# Patient Record
Sex: FEMALE | Race: WHITE | NOT HISPANIC OR LATINO | URBAN - METROPOLITAN AREA
[De-identification: names, ages, dates, MRNs, and addresses within clinical notes are randomized per-mention and may not be internally consistent; named-entity substitution may affect disease eponyms.]

---

## 2017-05-04 ENCOUNTER — OUTPATIENT (OUTPATIENT)
Dept: OUTPATIENT SERVICES | Facility: HOSPITAL | Age: 62
LOS: 1 days | Discharge: HOME | End: 2017-05-04

## 2017-06-28 DIAGNOSIS — R92.2 INCONCLUSIVE MAMMOGRAM: ICD-10-CM

## 2018-05-01 PROBLEM — Z00.00 ENCOUNTER FOR PREVENTIVE HEALTH EXAMINATION: Status: ACTIVE | Noted: 2018-05-01

## 2018-05-03 ENCOUNTER — RECORD ABSTRACTING (OUTPATIENT)
Age: 63
End: 2018-05-03

## 2018-05-03 DIAGNOSIS — Z87.19 PERSONAL HISTORY OF OTHER DISEASES OF THE DIGESTIVE SYSTEM: ICD-10-CM

## 2018-05-03 DIAGNOSIS — B97.7 PAPILLOMAVIRUS AS THE CAUSE OF DISEASES CLASSIFIED ELSEWHERE: ICD-10-CM

## 2018-05-03 DIAGNOSIS — Z80.0 FAMILY HISTORY OF MALIGNANT NEOPLASM OF DIGESTIVE ORGANS: ICD-10-CM

## 2018-05-03 DIAGNOSIS — Z87.39 PERSONAL HISTORY OF OTHER DISEASES OF THE MUSCULOSKELETAL SYSTEM AND CONNECTIVE TISSUE: ICD-10-CM

## 2018-05-03 DIAGNOSIS — Z78.0 ASYMPTOMATIC MENOPAUSAL STATE: ICD-10-CM

## 2018-05-04 ENCOUNTER — OUTPATIENT (OUTPATIENT)
Dept: OUTPATIENT SERVICES | Facility: HOSPITAL | Age: 63
LOS: 1 days | Discharge: HOME | End: 2018-05-04

## 2018-05-04 DIAGNOSIS — Z12.31 ENCOUNTER FOR SCREENING MAMMOGRAM FOR MALIGNANT NEOPLASM OF BREAST: ICD-10-CM

## 2018-05-16 ENCOUNTER — APPOINTMENT (OUTPATIENT)
Dept: OBGYN | Facility: CLINIC | Age: 63
End: 2018-05-16

## 2018-11-02 ENCOUNTER — OUTPATIENT (OUTPATIENT)
Dept: OUTPATIENT SERVICES | Facility: HOSPITAL | Age: 63
LOS: 1 days | Discharge: HOME | End: 2018-11-02

## 2018-11-02 DIAGNOSIS — R42 DIZZINESS AND GIDDINESS: ICD-10-CM

## 2019-05-02 ENCOUNTER — APPOINTMENT (OUTPATIENT)
Dept: OBGYN | Facility: CLINIC | Age: 64
End: 2019-05-02
Payer: COMMERCIAL

## 2019-05-02 PROCEDURE — 99000 SPECIMEN HANDLING OFFICE-LAB: CPT

## 2019-05-02 PROCEDURE — 99386 PREV VISIT NEW AGE 40-64: CPT

## 2019-05-02 PROCEDURE — 99213 OFFICE O/P EST LOW 20 MIN: CPT | Mod: 25

## 2019-05-09 ENCOUNTER — OUTPATIENT (OUTPATIENT)
Dept: OUTPATIENT SERVICES | Facility: HOSPITAL | Age: 64
LOS: 1 days | Discharge: HOME | End: 2019-05-09
Payer: COMMERCIAL

## 2019-05-09 DIAGNOSIS — Z12.31 ENCOUNTER FOR SCREENING MAMMOGRAM FOR MALIGNANT NEOPLASM OF BREAST: ICD-10-CM

## 2019-05-09 PROCEDURE — 77063 BREAST TOMOSYNTHESIS BI: CPT | Mod: 26

## 2019-05-09 PROCEDURE — 77067 SCR MAMMO BI INCL CAD: CPT | Mod: 26

## 2019-05-10 DIAGNOSIS — Z78.0 ASYMPTOMATIC MENOPAUSAL STATE: ICD-10-CM

## 2019-05-10 DIAGNOSIS — E55.9 VITAMIN D DEFICIENCY, UNSPECIFIED: ICD-10-CM

## 2019-05-10 DIAGNOSIS — Z13.820 ENCOUNTER FOR SCREENING FOR OSTEOPOROSIS: ICD-10-CM

## 2019-05-10 DIAGNOSIS — M89.9 DISORDER OF BONE, UNSPECIFIED: ICD-10-CM

## 2019-07-16 ENCOUNTER — OUTPATIENT (OUTPATIENT)
Dept: OUTPATIENT SERVICES | Facility: HOSPITAL | Age: 64
LOS: 1 days | Discharge: HOME | End: 2019-07-16

## 2019-07-16 ENCOUNTER — APPOINTMENT (OUTPATIENT)
Dept: UROLOGY | Facility: CLINIC | Age: 64
End: 2019-07-16
Payer: COMMERCIAL

## 2019-07-16 ENCOUNTER — TRANSCRIPTION ENCOUNTER (OUTPATIENT)
Age: 64
End: 2019-07-16

## 2019-07-16 VITALS
DIASTOLIC BLOOD PRESSURE: 74 MMHG | SYSTOLIC BLOOD PRESSURE: 137 MMHG | WEIGHT: 140 LBS | BODY MASS INDEX: 25.76 KG/M2 | HEIGHT: 62 IN | HEART RATE: 66 BPM

## 2019-07-16 DIAGNOSIS — Z87.898 PERSONAL HISTORY OF OTHER SPECIFIED CONDITIONS: ICD-10-CM

## 2019-07-16 DIAGNOSIS — Z82.49 FAMILY HISTORY OF ISCHEMIC HEART DISEASE AND OTHER DISEASES OF THE CIRCULATORY SYSTEM: ICD-10-CM

## 2019-07-16 DIAGNOSIS — Z78.9 OTHER SPECIFIED HEALTH STATUS: ICD-10-CM

## 2019-07-16 DIAGNOSIS — R31.29 OTHER MICROSCOPIC HEMATURIA: ICD-10-CM

## 2019-07-16 LAB
BILIRUB UR QL STRIP: NORMAL
CLARITY UR: CLEAR
COLLECTION METHOD: NORMAL
GLUCOSE UR-MCNC: NORMAL
HCG UR QL: NORMAL EU/DL
HGB UR QL STRIP.AUTO: NORMAL
KETONES UR-MCNC: NORMAL
LEUKOCYTE ESTERASE UR QL STRIP: NORMAL
NITRITE UR QL STRIP: NORMAL
PH UR STRIP: 7
PROT UR STRIP-MCNC: NORMAL
SP GR UR STRIP: 1

## 2019-07-16 PROCEDURE — 99203 OFFICE O/P NEW LOW 30 MIN: CPT

## 2019-07-16 PROCEDURE — 81003 URINALYSIS AUTO W/O SCOPE: CPT | Mod: QW

## 2019-07-16 RX ORDER — CHROMIUM 200 MCG
TABLET ORAL
Refills: 0 | Status: ACTIVE | COMMUNITY

## 2019-07-16 RX ORDER — ASCORBIC ACID 1000 MG
TABLET ORAL
Refills: 0 | Status: ACTIVE | COMMUNITY

## 2019-07-16 NOTE — ASSESSMENT
[FreeTextEntry1] : This is a 63-year-old female with a history of microscopic hematuria. She denies any risk factors. She has regular gynecological examinations, by her GYN, which she states are within normal limits.\par \par All options were reviewed and patient is electing to undergo formal hematuria workup including CT urogram and follow up cystoscopy, after normal BMP. All aspects of this procedure were reviewed in detail with regards to preparation, outcomes, and adverse events. Anaphylactoid reaction to contrast dye was reviewed.\par \par Patient will follow up after for review

## 2019-07-16 NOTE — PHYSICAL EXAM
[General Appearance - Well Developed] : well developed [General Appearance - Well Nourished] : well nourished [Normal Appearance] : normal appearance [Well Groomed] : well groomed [General Appearance - In No Acute Distress] : no acute distress [Heart Rate And Rhythm] : Heart rate and rhythm were normal [Edema] : no peripheral edema [Respiration, Rhythm And Depth] : normal respiratory rhythm and effort [Exaggerated Use Of Accessory Muscles For Inspiration] : no accessory muscle use [Auscultation Breath Sounds / Voice Sounds] : lungs were clear to auscultation bilaterally [Bowel Sounds] : normal bowel sounds [Abdomen Soft] : soft [Abdomen Tenderness] : non-tender [Costovertebral Angle Tenderness] : no ~M costovertebral angle tenderness [Urinary Bladder Findings] : the bladder was normal on palpation [Normal Station and Gait] : the gait and station were normal for the patient's age [] : no rash [No Focal Deficits] : no focal deficits [Oriented To Time, Place, And Person] : oriented to person, place, and time [Affect] : the affect was normal [Mood] : the mood was normal [Not Anxious] : not anxious

## 2019-07-16 NOTE — HISTORY OF PRESENT ILLNESS
[None] : None [FreeTextEntry1] : Julissa is a 63-year-old female who was sent for consultation regarding questionable microscopic hematuria. She has been having intermittent positive for blood urine dip sticks during her PCP visits over the past 2 years. She denies ever seeing blood, dysuria, history of renal stones, current or former smoking history, toxic chemical exposures, or chemotherapeutics in the past.\par \par She has been seen her gynecologist regularly. Her last visit was 5/19 her she was told that everything is fine.\par \par She denies any bone some irritative/obstructive voiding symptoms.

## 2019-07-17 LAB
APPEARANCE: CLEAR
BILIRUBIN URINE: NEGATIVE
BLOOD URINE: NEGATIVE
COLOR: NORMAL
GLUCOSE QUALITATIVE U: NEGATIVE
KETONES URINE: NEGATIVE
LEUKOCYTE ESTERASE URINE: NEGATIVE
NITRITE URINE: NEGATIVE
PH URINE: 7
PROTEIN URINE: NEGATIVE
SPECIFIC GRAVITY URINE: 1.01
URINE CYTOLOGY: NORMAL
UROBILINOGEN URINE: NORMAL

## 2019-07-18 ENCOUNTER — OTHER (OUTPATIENT)
Age: 64
End: 2019-07-18

## 2019-07-18 LAB — BACTERIA UR CULT: NORMAL

## 2019-07-21 ENCOUNTER — FORM ENCOUNTER (OUTPATIENT)
Age: 64
End: 2019-07-21

## 2019-07-22 ENCOUNTER — OUTPATIENT (OUTPATIENT)
Dept: OUTPATIENT SERVICES | Facility: HOSPITAL | Age: 64
LOS: 1 days | Discharge: HOME | End: 2019-07-22
Payer: COMMERCIAL

## 2019-07-22 DIAGNOSIS — R31.29 OTHER MICROSCOPIC HEMATURIA: ICD-10-CM

## 2019-07-22 PROCEDURE — 74178 CT ABD&PLV WO CNTR FLWD CNTR: CPT | Mod: 26

## 2019-08-13 ENCOUNTER — APPOINTMENT (OUTPATIENT)
Dept: UROLOGY | Facility: CLINIC | Age: 64
End: 2019-08-13
Payer: COMMERCIAL

## 2019-08-13 VITALS — WEIGHT: 140 LBS | HEIGHT: 62 IN | BODY MASS INDEX: 25.76 KG/M2

## 2019-08-13 PROCEDURE — 52000 CYSTOURETHROSCOPY: CPT

## 2019-12-03 ENCOUNTER — APPOINTMENT (OUTPATIENT)
Dept: UROLOGY | Facility: CLINIC | Age: 64
End: 2019-12-03
Payer: COMMERCIAL

## 2019-12-03 VITALS — BODY MASS INDEX: 25.76 KG/M2 | HEIGHT: 62 IN | WEIGHT: 140 LBS

## 2019-12-03 DIAGNOSIS — R31.29 OTHER MICROSCOPIC HEMATURIA: ICD-10-CM

## 2019-12-03 LAB
BILIRUB UR QL STRIP: NORMAL
CLARITY UR: CLEAR
COLLECTION METHOD: NORMAL
GLUCOSE UR-MCNC: NORMAL
HCG UR QL: NORMAL EU/DL
HGB UR QL STRIP.AUTO: NORMAL
KETONES UR-MCNC: NORMAL
LEUKOCYTE ESTERASE UR QL STRIP: 25
NITRITE UR QL STRIP: NORMAL
PH UR STRIP: 8
PROT UR STRIP-MCNC: NORMAL
SP GR UR STRIP: 1.01

## 2019-12-03 PROCEDURE — 99213 OFFICE O/P EST LOW 20 MIN: CPT

## 2019-12-03 NOTE — ASSESSMENT
[FreeTextEntry1] : Resolved microscopic hematuria post negative workup 3 months ago. No treatment required. Follow up one year

## 2019-12-03 NOTE — REVIEW OF SYSTEMS
[Fever] : no fever [Shortness Of Breath] : no shortness of breath [Chest Pain] : no chest pain [Abdominal Pain] : no abdominal pain [Dysuria] : no dysuria [Confused] : no confusion

## 2019-12-03 NOTE — HISTORY OF PRESENT ILLNESS
[FreeTextEntry1] : History of microscopic hematuria status post benign workup 3 months ago. She has no urinary complaints, no dysuria, no gross hematuria no flank pain no urgency.  UA is negative for blood today

## 2019-12-03 NOTE — PHYSICAL EXAM
[General Appearance - In No Acute Distress] : no acute distress [Costovertebral Angle Tenderness] : no ~M costovertebral angle tenderness [] : no respiratory distress [Edema] : no peripheral edema [Normal Station and Gait] : the gait and station were normal for the patient's age [Oriented To Time, Place, And Person] : oriented to person, place, and time

## 2020-07-24 ENCOUNTER — APPOINTMENT (OUTPATIENT)
Dept: OBGYN | Facility: CLINIC | Age: 65
End: 2020-07-24
Payer: COMMERCIAL

## 2020-07-24 VITALS
WEIGHT: 139 LBS | BODY MASS INDEX: 25.42 KG/M2 | DIASTOLIC BLOOD PRESSURE: 80 MMHG | TEMPERATURE: 98.1 F | SYSTOLIC BLOOD PRESSURE: 130 MMHG

## 2020-07-24 PROCEDURE — 99396 PREV VISIT EST AGE 40-64: CPT

## 2020-07-30 ENCOUNTER — RESULT REVIEW (OUTPATIENT)
Age: 65
End: 2020-07-30

## 2020-07-30 ENCOUNTER — OUTPATIENT (OUTPATIENT)
Dept: OUTPATIENT SERVICES | Facility: HOSPITAL | Age: 65
LOS: 1 days | Discharge: HOME | End: 2020-07-30
Payer: COMMERCIAL

## 2020-07-30 DIAGNOSIS — Z12.31 ENCOUNTER FOR SCREENING MAMMOGRAM FOR MALIGNANT NEOPLASM OF BREAST: ICD-10-CM

## 2020-07-30 PROCEDURE — 77063 BREAST TOMOSYNTHESIS BI: CPT | Mod: 26

## 2020-07-30 PROCEDURE — 77067 SCR MAMMO BI INCL CAD: CPT | Mod: 26

## 2021-07-26 ENCOUNTER — APPOINTMENT (OUTPATIENT)
Dept: OBGYN | Facility: CLINIC | Age: 66
End: 2021-07-26
Payer: COMMERCIAL

## 2021-07-26 VITALS
DIASTOLIC BLOOD PRESSURE: 82 MMHG | SYSTOLIC BLOOD PRESSURE: 129 MMHG | WEIGHT: 129 LBS | BODY MASS INDEX: 23.74 KG/M2 | HEIGHT: 62 IN

## 2021-07-26 DIAGNOSIS — Z01.419 ENCOUNTER FOR GYNECOLOGICAL EXAMINATION (GENERAL) (ROUTINE) W/OUT ABNORMAL FINDINGS: ICD-10-CM

## 2021-07-26 PROCEDURE — 99396 PREV VISIT EST AGE 40-64: CPT

## 2021-07-26 NOTE — HISTORY OF PRESENT ILLNESS
[FreeTextEntry1] : Patient is 65 years old para 4-0-0-4 last menstrual period age 49\par She denies postmenopausal bleeding and is presently without complaints

## 2021-07-26 NOTE — HISTORY OF PRESENT ILLNESS
[FreeTextEntry1] : Patient is 65 years old para 4-0-0-4 last menstrual period at age 49.\par She denies postmenopausal bleeding and is presently without complaints

## 2021-07-26 NOTE — PHYSICAL EXAM
[Awake] : awake [Mass] : no breast mass [Acute Distress] : no acute distress [Axillary LAD] : no axillary lymphadenopathy [Nipple Discharge] : no nipple discharge [Tender] : non tender [Cystocele] : a cystocele [No Bleeding] : there was no active vaginal bleeding [Appropriately responsive] : appropriately responsive [Alert] : alert [No Acute Distress] : no acute distress [No Lymphadenopathy] : no lymphadenopathy [Regular Rate Rhythm] : regular rate rhythm [No Murmurs] : no murmurs [Clear to Auscultation B/L] : clear to auscultation bilaterally [Soft] : soft [Non-tender] : non-tender [Non-distended] : non-distended [No HSM] : No HSM [No Lesions] : no lesions [No Mass] : no mass [Oriented x3] : oriented x3 [Examination Of The Breasts] : a normal appearance [No Masses] : no breast masses were palpable [Labia Majora] : normal [Labia Minora] : normal [Atrophy] : atrophy [Normal] : normal [Uterine Adnexae] : normal

## 2021-07-26 NOTE — PHYSICAL EXAM
[Awake] : awake [Mass] : no breast mass [Acute Distress] : no acute distress [Nipple Discharge] : no nipple discharge [Axillary LAD] : no axillary lymphadenopathy [Tender] : non tender [Cystocele] : a cystocele [No Bleeding] : there was no active vaginal bleeding [Appropriately responsive] : appropriately responsive [Alert] : alert [No Acute Distress] : no acute distress [No Lymphadenopathy] : no lymphadenopathy [Regular Rate Rhythm] : regular rate rhythm [No Murmurs] : no murmurs [Clear to Auscultation B/L] : clear to auscultation bilaterally [Soft] : soft [Non-tender] : non-tender [Non-distended] : non-distended [No HSM] : No HSM [No Lesions] : no lesions [No Mass] : no mass [Oriented x3] : oriented x3 [Examination Of The Breasts] : a normal appearance [No Masses] : no breast masses were palpable [Labia Majora] : normal [Labia Minora] : normal [Atrophy] : atrophy [Normal] : normal [Uterine Adnexae] : normal

## 2021-07-26 NOTE — COUNSELING
[Nutrition] : nutrition [Exercise] : exercise [Vitamins/Supplements] : vitamins/supplements [Breast Self Exam] : breast self exam

## 2021-08-03 ENCOUNTER — RESULT REVIEW (OUTPATIENT)
Age: 66
End: 2021-08-03

## 2021-08-03 ENCOUNTER — OUTPATIENT (OUTPATIENT)
Dept: OUTPATIENT SERVICES | Facility: HOSPITAL | Age: 66
LOS: 1 days | Discharge: HOME | End: 2021-08-03
Payer: COMMERCIAL

## 2021-08-03 DIAGNOSIS — Z12.31 ENCOUNTER FOR SCREENING MAMMOGRAM FOR MALIGNANT NEOPLASM OF BREAST: ICD-10-CM

## 2021-08-03 PROCEDURE — 77063 BREAST TOMOSYNTHESIS BI: CPT | Mod: 26

## 2021-08-03 PROCEDURE — 77067 SCR MAMMO BI INCL CAD: CPT | Mod: 26

## 2022-08-02 ENCOUNTER — APPOINTMENT (OUTPATIENT)
Dept: OBGYN | Facility: CLINIC | Age: 67
End: 2022-08-02

## 2022-08-02 VITALS — SYSTOLIC BLOOD PRESSURE: 114 MMHG | DIASTOLIC BLOOD PRESSURE: 64 MMHG | WEIGHT: 135 LBS | BODY MASS INDEX: 24.69 KG/M2

## 2022-08-02 DIAGNOSIS — Z01.419 ENCOUNTER FOR GYNECOLOGICAL EXAMINATION (GENERAL) (ROUTINE) W/OUT ABNORMAL FINDINGS: ICD-10-CM

## 2022-08-02 PROCEDURE — 99397 PER PM REEVAL EST PAT 65+ YR: CPT

## 2022-08-02 NOTE — HISTORY OF PRESENT ILLNESS
[FreeTextEntry1] : Patient is 66 years old para 4-0-0-4 last menstrual period age 49.\par She denies postmenopausal bleeding and is presently without complaints.

## 2022-08-13 ENCOUNTER — OUTPATIENT (OUTPATIENT)
Dept: OUTPATIENT SERVICES | Facility: HOSPITAL | Age: 67
LOS: 1 days | Discharge: HOME | End: 2022-08-13

## 2022-08-13 ENCOUNTER — RESULT REVIEW (OUTPATIENT)
Age: 67
End: 2022-08-13

## 2022-08-13 DIAGNOSIS — Z12.31 ENCOUNTER FOR SCREENING MAMMOGRAM FOR MALIGNANT NEOPLASM OF BREAST: ICD-10-CM

## 2022-08-13 PROCEDURE — 77067 SCR MAMMO BI INCL CAD: CPT | Mod: 26

## 2022-08-13 PROCEDURE — 77063 BREAST TOMOSYNTHESIS BI: CPT | Mod: 26

## 2022-11-26 ENCOUNTER — OUTPATIENT (OUTPATIENT)
Dept: OUTPATIENT SERVICES | Facility: HOSPITAL | Age: 67
LOS: 1 days | Discharge: HOME | End: 2022-11-26

## 2022-11-28 DIAGNOSIS — Z78.0 ASYMPTOMATIC MENOPAUSAL STATE: ICD-10-CM

## 2022-11-28 DIAGNOSIS — Z13.820 ENCOUNTER FOR SCREENING FOR OSTEOPOROSIS: ICD-10-CM

## 2022-11-28 DIAGNOSIS — M81.0 AGE-RELATED OSTEOPOROSIS WITHOUT CURRENT PATHOLOGICAL FRACTURE: ICD-10-CM

## 2023-08-07 ENCOUNTER — APPOINTMENT (OUTPATIENT)
Dept: OBGYN | Facility: CLINIC | Age: 68
End: 2023-08-07
Payer: COMMERCIAL

## 2023-08-07 VITALS
DIASTOLIC BLOOD PRESSURE: 78 MMHG | SYSTOLIC BLOOD PRESSURE: 110 MMHG | WEIGHT: 138 LBS | BODY MASS INDEX: 25.4 KG/M2 | HEIGHT: 62 IN

## 2023-08-07 DIAGNOSIS — Z01.419 ENCOUNTER FOR GYNECOLOGICAL EXAMINATION (GENERAL) (ROUTINE) W/OUT ABNORMAL FINDINGS: ICD-10-CM

## 2023-08-07 PROCEDURE — 99397 PER PM REEVAL EST PAT 65+ YR: CPT

## 2023-08-07 NOTE — HISTORY OF PRESENT ILLNESS
[FreeTextEntry1] : Patient is 67 years old para 4-0-0-4 last menstrual period age 49. She denies postmenopausal bleeding and is present without complaints. Patient states that she has been diagnosed with osteoporosis but is reluctant to start treatment.

## 2023-08-07 NOTE — DISCUSSION/SUMMARY
[FreeTextEntry1] : Pap done Self breast exam stressed Prescribed bilateral screening mammogram Advised rheumatology consultation Follow-up yearly or as needed

## 2023-08-07 NOTE — PHYSICAL EXAM
[Chaperone Present] : A chaperone was present in the examining room during all aspects of the physical examination [FreeTextEntry1] : Shanika [Appropriately responsive] : appropriately responsive [Alert] : alert [No Acute Distress] : no acute distress [No Lymphadenopathy] : no lymphadenopathy [Regular Rate Rhythm] : regular rate rhythm [No Murmurs] : no murmurs [Clear to Auscultation B/L] : clear to auscultation bilaterally [Soft] : soft [Non-tender] : non-tender [Non-distended] : non-distended [No HSM] : No HSM [No Lesions] : no lesions [No Mass] : no mass [Oriented x3] : oriented x3 [Examination Of The Breasts] : a normal appearance [No Masses] : no breast masses were palpable [Labia Majora] : normal [Labia Minora] : normal [Normal] : normal [Uterine Adnexae] : normal

## 2023-08-15 ENCOUNTER — OUTPATIENT (OUTPATIENT)
Dept: OUTPATIENT SERVICES | Facility: HOSPITAL | Age: 68
LOS: 1 days | End: 2023-08-15
Payer: COMMERCIAL

## 2023-08-15 ENCOUNTER — RESULT REVIEW (OUTPATIENT)
Age: 68
End: 2023-08-15

## 2023-08-15 DIAGNOSIS — Z12.31 ENCOUNTER FOR SCREENING MAMMOGRAM FOR MALIGNANT NEOPLASM OF BREAST: ICD-10-CM

## 2023-08-15 PROCEDURE — 77063 BREAST TOMOSYNTHESIS BI: CPT | Mod: 26

## 2023-08-15 PROCEDURE — 77067 SCR MAMMO BI INCL CAD: CPT | Mod: 26

## 2023-08-15 PROCEDURE — 77067 SCR MAMMO BI INCL CAD: CPT

## 2023-08-15 PROCEDURE — 77063 BREAST TOMOSYNTHESIS BI: CPT

## 2023-08-16 DIAGNOSIS — Z12.31 ENCOUNTER FOR SCREENING MAMMOGRAM FOR MALIGNANT NEOPLASM OF BREAST: ICD-10-CM

## 2024-08-15 ENCOUNTER — APPOINTMENT (OUTPATIENT)
Dept: OBGYN | Facility: CLINIC | Age: 69
End: 2024-08-15
Payer: COMMERCIAL

## 2024-08-15 VITALS
WEIGHT: 142 LBS | SYSTOLIC BLOOD PRESSURE: 152 MMHG | BODY MASS INDEX: 26.13 KG/M2 | HEIGHT: 62 IN | DIASTOLIC BLOOD PRESSURE: 76 MMHG

## 2024-08-15 DIAGNOSIS — Z01.419 ENCOUNTER FOR GYNECOLOGICAL EXAMINATION (GENERAL) (ROUTINE) W/OUT ABNORMAL FINDINGS: ICD-10-CM

## 2024-08-15 PROCEDURE — 99459 PELVIC EXAMINATION: CPT

## 2024-08-15 PROCEDURE — 99397 PER PM REEVAL EST PAT 65+ YR: CPT | Mod: 25

## 2024-08-15 NOTE — HISTORY OF PRESENT ILLNESS
[FreeTextEntry1] : Patient is 68 years old para 4-0-0-4 last menstrual period age 49 She denies postmenopausal bleeding. Patient has a history of osteoporosis diagnosed on DEXA scan approximately 2 years ago.  She is requesting repeat DEXA scan Her primary care provider is Dr. Mattie Lara

## 2024-08-15 NOTE — DISCUSSION/SUMMARY
[FreeTextEntry1] : Pap done Self breast exam stressed Prescribed bilateral screening mammogram Prescribed DEXA scan Follow-up yearly or as needed

## 2024-08-20 ENCOUNTER — RESULT REVIEW (OUTPATIENT)
Age: 69
End: 2024-08-20

## 2024-08-20 ENCOUNTER — OUTPATIENT (OUTPATIENT)
Dept: OUTPATIENT SERVICES | Facility: HOSPITAL | Age: 69
LOS: 1 days | End: 2024-08-20
Payer: COMMERCIAL

## 2024-08-20 DIAGNOSIS — Z13.820 ENCOUNTER FOR SCREENING FOR OSTEOPOROSIS: ICD-10-CM

## 2024-08-20 DIAGNOSIS — Z12.31 ENCOUNTER FOR SCREENING MAMMOGRAM FOR MALIGNANT NEOPLASM OF BREAST: ICD-10-CM

## 2024-08-20 PROCEDURE — 77067 SCR MAMMO BI INCL CAD: CPT

## 2024-08-20 PROCEDURE — 77063 BREAST TOMOSYNTHESIS BI: CPT | Mod: 26

## 2024-08-20 PROCEDURE — 77063 BREAST TOMOSYNTHESIS BI: CPT

## 2024-08-20 PROCEDURE — 77080 DXA BONE DENSITY AXIAL: CPT | Mod: 26

## 2024-08-20 PROCEDURE — 77067 SCR MAMMO BI INCL CAD: CPT | Mod: 26

## 2024-08-20 PROCEDURE — 77080 DXA BONE DENSITY AXIAL: CPT

## 2024-08-21 DIAGNOSIS — Z13.820 ENCOUNTER FOR SCREENING FOR OSTEOPOROSIS: ICD-10-CM

## 2024-08-21 DIAGNOSIS — Z12.31 ENCOUNTER FOR SCREENING MAMMOGRAM FOR MALIGNANT NEOPLASM OF BREAST: ICD-10-CM

## 2024-10-25 ENCOUNTER — NON-APPOINTMENT (OUTPATIENT)
Age: 69
End: 2024-10-25

## 2024-10-25 ENCOUNTER — APPOINTMENT (OUTPATIENT)
Facility: CLINIC | Age: 69
End: 2024-10-25

## 2024-10-25 PROCEDURE — 92202 OPSCPY EXTND ON/MAC DRAW: CPT

## 2024-10-25 PROCEDURE — 99204 OFFICE O/P NEW MOD 45 MIN: CPT

## 2024-10-25 PROCEDURE — 92134 CPTRZ OPH DX IMG PST SGM RTA: CPT

## 2025-05-21 ENCOUNTER — RESULT REVIEW (OUTPATIENT)
Age: 70
End: 2025-05-21

## 2025-05-21 ENCOUNTER — OUTPATIENT (OUTPATIENT)
Dept: OUTPATIENT SERVICES | Facility: HOSPITAL | Age: 70
LOS: 1 days | End: 2025-05-21
Payer: COMMERCIAL

## 2025-05-21 DIAGNOSIS — R06.02 SHORTNESS OF BREATH: ICD-10-CM

## 2025-05-21 DIAGNOSIS — Z00.8 ENCOUNTER FOR OTHER GENERAL EXAMINATION: ICD-10-CM

## 2025-05-21 PROCEDURE — 75574 CT ANGIO HRT W/3D IMAGE: CPT | Mod: 26

## 2025-05-21 PROCEDURE — 75574 CT ANGIO HRT W/3D IMAGE: CPT

## 2025-05-22 DIAGNOSIS — R06.02 SHORTNESS OF BREATH: ICD-10-CM

## 2025-05-27 ENCOUNTER — OUTPATIENT (OUTPATIENT)
Dept: OUTPATIENT SERVICES | Facility: HOSPITAL | Age: 70
LOS: 1 days | End: 2025-05-27
Payer: COMMERCIAL

## 2025-05-27 ENCOUNTER — RESULT REVIEW (OUTPATIENT)
Age: 70
End: 2025-05-27

## 2025-05-27 DIAGNOSIS — R93.1 ABNORMAL FINDINGS ON DIAGNOSTIC IMAGING OF HEART AND CORONARY CIRCULATION: ICD-10-CM

## 2025-05-27 PROCEDURE — 75580 N-INVAS EST C FFR SW ALY CTA: CPT | Mod: 26

## 2025-05-27 PROCEDURE — 75580 N-INVAS EST C FFR SW ALY CTA: CPT

## 2025-05-28 DIAGNOSIS — R93.1 ABNORMAL FINDINGS ON DIAGNOSTIC IMAGING OF HEART AND CORONARY CIRCULATION: ICD-10-CM

## 2025-06-02 VITALS
SYSTOLIC BLOOD PRESSURE: 119 MMHG | RESPIRATION RATE: 15 BRPM | HEIGHT: 62 IN | WEIGHT: 143.96 LBS | HEART RATE: 78 BPM | OXYGEN SATURATION: 98 % | DIASTOLIC BLOOD PRESSURE: 63 MMHG

## 2025-06-02 NOTE — H&P CARDIOLOGY - NSICDXFAMILYHX_GEN_ALL_CORE_FT
FAMILY HISTORY:  Father  Still living? Unknown  FH: myocardial infarction, Age at diagnosis: 31-40    Sibling  Still living? Unknown  Family history of CABG, Age at diagnosis: 41-50

## 2025-06-02 NOTE — H&P CARDIOLOGY - NSICDXPASTMEDICALHX_GEN_ALL_CORE_FT
PAST MEDICAL HISTORY:  HLD (hyperlipidemia)      PAST MEDICAL HISTORY:  CAD (coronary artery disease)     HLD (hyperlipidemia)     Hypercholesteremia

## 2025-06-02 NOTE — H&P CARDIOLOGY - HISTORY OF PRESENT ILLNESS
68 y/o F with strong FHx for CAD, PMHx of HLD, who presented to her cardiologist with c/o POLANCO ...................  Denies any chest pain, dizziness, n/v, diaphoresis, orthopnea, PND, LE edema, palpitations, syncope.  She underwent a CCTA on 4/24/25 which revealed a Calcium score of 634, and moderate stenosis in the proximal LAD and mid RCA due to calcified plaque. CT FFR -> RCA 0.76.  TTE 4/24/25: Ef normal GIDD.  Pt is now referred for [facility] with possible intervention if clinically indicated.     Pre cath note:  indication:  [ ] STEMI                [ ] NSTEMI                 [ ] Acute coronary syndrome                   [ ]Unstable Angina   [ ] high risk  [ ] intermediate risk  [ ] low risk                   [ ] Stable Angina     non-invasive testing:  CCTA             Date: 5/21/25        result: [ ] high risk  [ x] intermediate risk  [ ] low risk    Anti- Anginal medications:                    [ ] not used d/t                     [ ] used   ( ) BB     ( ) CCB      ( ) Nitrate   (  ) Ranexa          [ ] not used but strong indication not to use    Ejection Fraction                   [ ] <29            [ ] 30-39%   [ ] 40-49%     [ ]>50%    CHF          [ ] active (within last 14 days on meds   [ ] Chronic (on meds but no exacerbation)  NYHA Functional Class:  (  ) Class I (no limitations)  (  ) Class II (slight limitation)  (  ) Class III (marked limitation)  (  ) Class IV (symptoms at rest)    COPD                   [ ] mild (on chronic bronchodilators)  [ ] moderate (on chronic steroid therapy)      [ ] severe (indication for home O2 or PACO2 >50)    Other risk factors:                     [ ] Previous MI                     [ ] CVA/ stroke                    [ ] carotid stent/ CEA                    [ ] PVD/PAD- (arterial aneurysm, non-palpable pulses, tortuous vessel with inability to insert catheter, infra-renal dissection, renal or subclavian artery stenosis)                    [ ] previous CABG                    [ ] Renal Failure:  on HD  (  ) yes  (  ) no                    [ ] Diabetic  (  ) Type 1  (  ) Type 2                                         (  ) Insulin dependent  (  ) non-insulin dependent                                         (  ) Metformin  (  ) Januvia  (  ) Glimepiride  (  ) Glipizide  (  ) Glyburide  (  ) Actos                                         (  ) GLP-1 receptor agonists (Ozempic, Mounjaro, Wegovy, Zepbound, Trulicity, Byetta, Victoza)                                         (  ) SGLT2 Inhibitors (Farxiga, Jardiance, Invokana)                                         (  ) Other    Bleeding Risk: 1.6%    Pre-cath Hydration: (  )  cc IV bolus x 1 over 1 hr followed by:    (  ) NS @ 75cc/hr until procedure (up to 2 hrs) if EF> 50%                                                                                                                             (  ) NS @ 50cc/hr until procedure (up to 2 hrs) if EF< 50%                                        (  ) No precath hydration d/t      RIGHT RADIAL ARTERY EVALUATION:  LUIS FELIPE TEST: [] Negative          [] Positive         70 y/o F with strong FHx for CAD, PMHx of HLD, who presented to her cardiologist with c/o POLANCO. Denies any chest pain, dizziness, n/v, diaphoresis, orthopnea, PND, LE edema, palpitations, syncope.  She underwent a CCTA on 4/24/25 which revealed a Calcium score of 634, and moderate stenosis in the proximal LAD and mid RCA due to calcified plaque. CT FFR -> RCA 0.76.  TTE 4/24/25: Ef normal GIDD.  Pt is now referred for [facility] with possible intervention if clinically indicated.     Pre cath note:  indication:  [ ] STEMI                [ ] NSTEMI                 [ ] Acute coronary syndrome                   [ ]Unstable Angina   [ ] high risk  [ ] intermediate risk  [ ] low risk                   [ ] Stable Angina     non-invasive testing:  CCTA             Date: 5/21/25        result: [ ] high risk  [ x] intermediate risk  [ ] low risk    Anti- Anginal medications:                    [x ] not used d/t  soft B/P                   [ ] used   ( ) BB     ( ) CCB      ( ) Nitrate   (  ) Ranexa          [ ] not used but strong indication not to use    Ejection Fraction                   [ ] <29            [ ] 30-39%   [ ] 40-49%     [x ]>50%    CHF          [ ] active (within last 14 days on meds   [ ] Chronic (on meds but no exacerbation)  NYHA Functional Class:  (  ) Class I (no limitations)  (  ) Class II (slight limitation)  (  ) Class III (marked limitation)  (  ) Class IV (symptoms at rest)    COPD                   [ ] mild (on chronic bronchodilators)  [ ] moderate (on chronic steroid therapy)      [ ] severe (indication for home O2 or PACO2 >50)    Other risk factors:                     [ ] Previous MI                     [ ] CVA/ stroke                    [ ] carotid stent/ CEA                    [ ] PVD/PAD- (arterial aneurysm, non-palpable pulses, tortuous vessel with inability to insert catheter, infra-renal dissection, renal or subclavian artery stenosis)                    [ ] previous CABG                    [ ] Renal Failure:  on HD  (  ) yes  (  ) no                    [ ] Diabetic  (  ) Type 1  (  ) Type 2                                         (  ) Insulin dependent  (  ) non-insulin dependent                                         (  ) Metformin  (  ) Januvia  (  ) Glimepiride  (  ) Glipizide  (  ) Glyburide  (  ) Actos                                         (  ) GLP-1 receptor agonists (Ozempic, Mounjaro, Wegovy, Zepbound, Trulicity, Byetta, Victoza)                                         (  ) SGLT2 Inhibitors (Farxiga, Jardiance, Invokana)                                         (  ) Other    Bleeding Risk: 1.6%    Pre-cath Hydration: (x )  cc IV bolus x 1 over 1 hr followed by:    ( x ) NS @ 75cc/hr until procedure (up to 2 hrs) if EF> 50%                                                                                                                            RIGHT RADIAL ARTERY EVALUATION:  LUIS FELIPE TEST: [] Negative          [x] Positive

## 2025-06-03 ENCOUNTER — TRANSCRIPTION ENCOUNTER (OUTPATIENT)
Age: 70
End: 2025-06-03

## 2025-06-03 ENCOUNTER — OUTPATIENT (OUTPATIENT)
Dept: OUTPATIENT SERVICES | Facility: HOSPITAL | Age: 70
LOS: 1 days | Discharge: HOME | End: 2025-06-03
Payer: COMMERCIAL

## 2025-06-03 VITALS
SYSTOLIC BLOOD PRESSURE: 110 MMHG | OXYGEN SATURATION: 96 % | DIASTOLIC BLOOD PRESSURE: 50 MMHG | RESPIRATION RATE: 18 BRPM | HEART RATE: 70 BPM

## 2025-06-03 DIAGNOSIS — I25.10 ATHEROSCLEROTIC HEART DISEASE OF NATIVE CORONARY ARTERY WITHOUT ANGINA PECTORIS: ICD-10-CM

## 2025-06-03 LAB
ANION GAP SERPL CALC-SCNC: 10 MMOL/L — SIGNIFICANT CHANGE UP (ref 7–14)
ANION GAP SERPL CALC-SCNC: 11 MMOL/L — SIGNIFICANT CHANGE UP (ref 7–14)
BUN SERPL-MCNC: 8 MG/DL — LOW (ref 10–20)
BUN SERPL-MCNC: 9 MG/DL — LOW (ref 10–20)
CALCIUM SERPL-MCNC: 8.4 MG/DL — SIGNIFICANT CHANGE UP (ref 8.4–10.5)
CALCIUM SERPL-MCNC: 9.2 MG/DL — SIGNIFICANT CHANGE UP (ref 8.4–10.5)
CHLORIDE SERPL-SCNC: 104 MMOL/L — SIGNIFICANT CHANGE UP (ref 98–110)
CHLORIDE SERPL-SCNC: 107 MMOL/L — SIGNIFICANT CHANGE UP (ref 98–110)
CO2 SERPL-SCNC: 23 MMOL/L — SIGNIFICANT CHANGE UP (ref 17–32)
CO2 SERPL-SCNC: 25 MMOL/L — SIGNIFICANT CHANGE UP (ref 17–32)
CREAT SERPL-MCNC: 0.6 MG/DL — LOW (ref 0.7–1.5)
CREAT SERPL-MCNC: 0.8 MG/DL — SIGNIFICANT CHANGE UP (ref 0.7–1.5)
EGFR: 80 ML/MIN/1.73M2 — SIGNIFICANT CHANGE UP
EGFR: 80 ML/MIN/1.73M2 — SIGNIFICANT CHANGE UP
EGFR: 98 ML/MIN/1.73M2 — SIGNIFICANT CHANGE UP
EGFR: 98 ML/MIN/1.73M2 — SIGNIFICANT CHANGE UP
GLUCOSE SERPL-MCNC: 100 MG/DL — HIGH (ref 70–99)
GLUCOSE SERPL-MCNC: 87 MG/DL — SIGNIFICANT CHANGE UP (ref 70–99)
HCT VFR BLD CALC: 32.7 % — LOW (ref 37–47)
HCT VFR BLD CALC: 38.1 % — SIGNIFICANT CHANGE UP (ref 37–47)
HGB BLD-MCNC: 10.9 G/DL — LOW (ref 12–16)
HGB BLD-MCNC: 13 G/DL — SIGNIFICANT CHANGE UP (ref 12–16)
MCHC RBC-ENTMCNC: 30.4 PG — SIGNIFICANT CHANGE UP (ref 27–31)
MCHC RBC-ENTMCNC: 31 PG — SIGNIFICANT CHANGE UP (ref 27–31)
MCHC RBC-ENTMCNC: 33.3 G/DL — SIGNIFICANT CHANGE UP (ref 32–37)
MCHC RBC-ENTMCNC: 34.1 G/DL — SIGNIFICANT CHANGE UP (ref 32–37)
MCV RBC AUTO: 90.9 FL — SIGNIFICANT CHANGE UP (ref 81–99)
MCV RBC AUTO: 91.3 FL — SIGNIFICANT CHANGE UP (ref 81–99)
NRBC BLD AUTO-RTO: 0 /100 WBCS — SIGNIFICANT CHANGE UP (ref 0–0)
NRBC BLD AUTO-RTO: 0 /100 WBCS — SIGNIFICANT CHANGE UP (ref 0–0)
PLATELET # BLD AUTO: 219 K/UL — SIGNIFICANT CHANGE UP (ref 130–400)
PLATELET # BLD AUTO: 264 K/UL — SIGNIFICANT CHANGE UP (ref 130–400)
PMV BLD: 9.1 FL — SIGNIFICANT CHANGE UP (ref 7.4–10.4)
PMV BLD: 9.1 FL — SIGNIFICANT CHANGE UP (ref 7.4–10.4)
POTASSIUM SERPL-MCNC: 3.6 MMOL/L — SIGNIFICANT CHANGE UP (ref 3.5–5)
POTASSIUM SERPL-MCNC: 4.4 MMOL/L — SIGNIFICANT CHANGE UP (ref 3.5–5)
POTASSIUM SERPL-SCNC: 3.6 MMOL/L — SIGNIFICANT CHANGE UP (ref 3.5–5)
POTASSIUM SERPL-SCNC: 4.4 MMOL/L — SIGNIFICANT CHANGE UP (ref 3.5–5)
RBC # BLD: 3.58 M/UL — LOW (ref 4.2–5.4)
RBC # BLD: 4.19 M/UL — LOW (ref 4.2–5.4)
RBC # FLD: 13.2 % — SIGNIFICANT CHANGE UP (ref 11.5–14.5)
RBC # FLD: 13.2 % — SIGNIFICANT CHANGE UP (ref 11.5–14.5)
SODIUM SERPL-SCNC: 139 MMOL/L — SIGNIFICANT CHANGE UP (ref 135–146)
SODIUM SERPL-SCNC: 140 MMOL/L — SIGNIFICANT CHANGE UP (ref 135–146)
WBC # BLD: 4.52 K/UL — LOW (ref 4.8–10.8)
WBC # BLD: 4.8 K/UL — SIGNIFICANT CHANGE UP (ref 4.8–10.8)
WBC # FLD AUTO: 4.52 K/UL — LOW (ref 4.8–10.8)
WBC # FLD AUTO: 4.8 K/UL — SIGNIFICANT CHANGE UP (ref 4.8–10.8)

## 2025-06-03 PROCEDURE — 92978 ENDOLUMINL IVUS OCT C 1ST: CPT | Mod: RC

## 2025-06-03 PROCEDURE — 85027 COMPLETE CBC AUTOMATED: CPT

## 2025-06-03 PROCEDURE — C1894: CPT

## 2025-06-03 PROCEDURE — C1769: CPT

## 2025-06-03 PROCEDURE — C1753: CPT

## 2025-06-03 PROCEDURE — 36415 COLL VENOUS BLD VENIPUNCTURE: CPT

## 2025-06-03 PROCEDURE — 93458 L HRT ARTERY/VENTRICLE ANGIO: CPT | Mod: 59

## 2025-06-03 PROCEDURE — C1725: CPT

## 2025-06-03 PROCEDURE — 80048 BASIC METABOLIC PNL TOTAL CA: CPT

## 2025-06-03 PROCEDURE — C1887: CPT

## 2025-06-03 PROCEDURE — C9600: CPT | Mod: RC

## 2025-06-03 PROCEDURE — C1874: CPT

## 2025-06-03 RX ORDER — CLOPIDOGREL BISULFATE 75 MG/1
1 TABLET, FILM COATED ORAL
Qty: 30 | Refills: 2
Start: 2025-06-03 | End: 2025-08-31

## 2025-06-03 RX ORDER — ASPIRIN 325 MG
324 TABLET ORAL ONCE
Refills: 0 | Status: COMPLETED | OUTPATIENT
Start: 2025-06-03 | End: 2025-06-03

## 2025-06-03 RX ORDER — ASPIRIN 325 MG
1 TABLET ORAL
Qty: 30 | Refills: 0
Start: 2025-06-03 | End: 2025-07-02

## 2025-06-03 RX ORDER — ROSUVASTATIN CALCIUM 20 MG/1
1 TABLET, FILM COATED ORAL
Refills: 0 | DISCHARGE

## 2025-06-03 RX ADMIN — Medication 324 MILLIGRAM(S): at 09:35

## 2025-06-03 RX ADMIN — Medication 75 MILLILITER(S): at 09:35

## 2025-06-03 RX ADMIN — Medication 150 MILLILITER(S): at 19:03

## 2025-06-03 RX ADMIN — Medication 250 MILLILITER(S): at 10:03

## 2025-06-03 RX ADMIN — Medication 150 MILLILITER(S): at 13:48

## 2025-06-03 NOTE — ASU DISCHARGE PLAN (ADULT/PEDIATRIC) - FINANCIAL ASSISTANCE
Health system provides services at a reduced cost to those who are determined to be eligible through Health system’s financial assistance program. Information regarding Health system’s financial assistance program can be found by going to [email or web address] or by calling [phone].

## 2025-06-03 NOTE — ASU PATIENT PROFILE, ADULT - FALL HARM RISK - UNIVERSAL INTERVENTIONS
Bed in lowest position, wheels locked, appropriate side rails in place/Call bell, personal items and telephone in reach/Instruct patient to call for assistance before getting out of bed or chair/Non-slip footwear when patient is out of bed/[place] to call system/Physically safe environment - no spills, clutter or unnecessary equipment/Purposeful Proactive Rounding/Room/bathroom lighting operational, light cord in reach

## 2025-06-03 NOTE — PROGRESS NOTE ADULT - SUBJECTIVE AND OBJECTIVE BOX
Cardiology Follow up    ERICA CEE   69y Female  PAST MEDICAL & SURGICAL HISTORY:  HLD (hyperlipidemia)      CAD (coronary artery disease)      Hypercholesteremia      No significant past surgical history           HPI:    70 y/o F with strong FHx for CAD, PMHx of HLD, who presented to her cardiologist with c/o POLANCO ...................  Denies any chest pain, dizziness, n/v, diaphoresis, orthopnea, PND, LE edema, palpitations, syncope.  She underwent a CCTA on 4/24/25 which revealed a Calcium score of 634, and moderate stenosis in the proximal LAD and mid RCA due to calcified plaque. CT FFR -> RCA 0.76.  TTE 4/24/25: Ef normal GIDD.  Pt is now referred for [facility] with possible intervention if clinically indicated.     Pre cath note:  indication:  [ ] STEMI                [ ] NSTEMI                 [ ] Acute coronary syndrome                   [ ]Unstable Angina   [ ] high risk  [ ] intermediate risk  [ ] low risk                   [ ] Stable Angina     non-invasive testing:  CCTA             Date: 5/21/25        result: [ ] high risk  [ x] intermediate risk  [ ] low risk    Anti- Anginal medications:                    [ ] not used d/t                     [ ] used   ( ) BB     ( ) CCB      ( ) Nitrate   (  ) Ranexa          [ ] not used but strong indication not to use    Ejection Fraction                   [ ] <29            [ ] 30-39%   [ ] 40-49%     [ ]>50%    CHF          [ ] active (within last 14 days on meds   [ ] Chronic (on meds but no exacerbation)  NYHA Functional Class:  (  ) Class I (no limitations)  (  ) Class II (slight limitation)  (  ) Class III (marked limitation)  (  ) Class IV (symptoms at rest)    COPD                   [ ] mild (on chronic bronchodilators)  [ ] moderate (on chronic steroid therapy)      [ ] severe (indication for home O2 or PACO2 >50)    Other risk factors:                     [ ] Previous MI                     [ ] CVA/ stroke                    [ ] carotid stent/ CEA                    [ ] PVD/PAD- (arterial aneurysm, non-palpable pulses, tortuous vessel with inability to insert catheter, infra-renal dissection, renal or subclavian artery stenosis)                    [ ] previous CABG                    [ ] Renal Failure:  on HD  (  ) yes  (  ) no                    [ ] Diabetic  (  ) Type 1  (  ) Type 2                                         (  ) Insulin dependent  (  ) non-insulin dependent                                         (  ) Metformin  (  ) Januvia  (  ) Glimepiride  (  ) Glipizide  (  ) Glyburide  (  ) Actos                                         (  ) GLP-1 receptor agonists (Ozempic, Mounjaro, Wegovy, Zepbound, Trulicity, Byetta, Victoza)                                         (  ) SGLT2 Inhibitors (Farxiga, Jardiance, Invokana)                                         (  ) Other    Bleeding Risk: 1.6%    Pre-cath Hydration: (  )  cc IV bolus x 1 over 1 hr followed by:    (  ) NS @ 75cc/hr until procedure (up to 2 hrs) if EF> 50%                                                                                                                             (  ) NS @ 50cc/hr until procedure (up to 2 hrs) if EF< 50%                                        (  ) No precath hydration d/t      RIGHT RADIAL ARTERY EVALUATION:  LUIS FELIPE TEST: [] Negative          [] Positive       (02 Jun 2025 12:04)    Allergies    No Known Allergies    Intolerances      Patient seen and examined at bedside.   Patient without complaints.   Denies CP, SOB, palpitations, or dizziness    Vital Signs  HR: 70  BP: 108/43  RR: 16  SpO2: 98% on RA        MEDICATIONS  (STANDING):  chlorhexidine 4% Liquid 1 Application(s) Topical once  sodium chloride 0.9%. 250 milliLiter(s) (250 mL/Hr) IV Continuous <Continuous>  sodium chloride 0.9%. 150 milliLiter(s) (75 mL/Hr) IV Continuous <Continuous>  sodium chloride 0.9%. 1000 milliLiter(s) (150 mL/Hr) IV Continuous <Continuous>    MEDICATIONS  (PRN):      REVIEW OF SYSTEMS:          All negative except as mentioned in HPI    PHYSICAL EXAM:           CONSTITUTIONAL: Well-developed; well-nourished; in no acute distress  	SKIN: warm, dry  	HEAD: Normocephalic; atraumatic  	EYES: PERRL.  	ENT: No nasal discharge, airway clear, mucous membranes moist  	NECK: Supple; non tender.  	CARD: +S1, +S2, no murmurs, gallops, or rubs. Regular rate and rhythm    	RESP: No wheezes, rales or rhonchi. CTA B/L  	ABD: soft ntnd, + BS x 4 quadrants  	EXT: moves all extremities,  no clubbing, cyanosis or edema  	NEURO: Alert and oriented x3, no focal deficits          PSYCH: Cooperative, appropriate          VASCULAR:  + Rad / + PTs / + DPs          EXTREMITY:             	   Right Radial: Dressing D/C/I, DSTAT in place, access site soft, no hematoma, no pain, + pulses, no sign of infection, no numbness            ECG: pending    LABS:                        13.0   4.52  )-----------( 264      ( 03 Jun 2025 09:44 )             38.1     06-03    140  |  104  |  9[L]  ----------------------------<  100[H]  4.4   |  25  |  0.8    Ca    9.2      03 Jun 2025 09:44      A/P:  I discussed the case with Cardiologist Dr. Peraza and recommend the following:    S/P PCI:    Intervention: Successful IVUS guided balloon angioplasty with RACHEL X 1 of 80% RCA stenosis    Implants: Synergy 3.5 X 24 to mid RCA  FINDINGS:     Coronary Dominance: Right    LM: Mild atherosclerosis with 30% stenosis    LAD: Prox LAD mild atherosclerosis with 30% stenosis  D1: Mild atherosclerosis    CX: Mild atherosclerosis  OM1: Mild atherosclerosis    RCA: Prox RCA 80% stenosis s/p PCI. Distal RCA 30% stenosis  RPDA: Mild atherosclerosis      	     Continue DAPT ( Aspirin 81 mg PO Daily and Plavix 75 mg daily  ), Statin Therapy                   Patient given 30 day supply of ( Aspirin 81 mg daily and Plavix 75 mg daily ) to take at home                   NO BB/ACEi, patient with marginal BP                   START Famotidine 20mg po x 30 days                   CBC at 1730                    EKG prior to d/c at 1730                   NS at 150 ml/hr x 6hrs                   OOB Ambulate                    Monitor access site/ distal pulses                   Patient agreeing to take DAPT for at least one year or as directed by cardiologist                    Pt given instructions on importance of taking antiplatelet medication or risk acute stent thrombosis/death                   Post cath instructions, access site care and activity restrictions reviewed with patient                     Discussed with patient to return to hospital if experience chest pain, shortness breath, dizziness and site bleeding                   Aggressive risk factor modification, diet counseling, smoking cessation discussed with patient                    Benefits of cardiac rehab discussed with patient                    Cardiac rehab info provided/referral and communication to cardiac rehab completed                   Patient instructed to call cardiac rehab and make initial appointment after first follow-up visit with Cardiologist                    Can discharge patient at 2030 from cardiac standpoint after ambulating without symptoms, access site wnl, labs and ECG reviewed                    Follow up with Cardiology Dr. Peraza in two weeks.  Instructed to call and make an appointment                                       Cardiology Follow up    ERICA CEE   69y Female  PAST MEDICAL & SURGICAL HISTORY:  HLD (hyperlipidemia)      CAD (coronary artery disease)      Hypercholesteremia      No significant past surgical history           HPI:    70 y/o F with strong FHx for CAD, PMHx of HLD, who presented to her cardiologist with c/o POLANCO ...................  Denies any chest pain, dizziness, n/v, diaphoresis, orthopnea, PND, LE edema, palpitations, syncope.  She underwent a CCTA on 4/24/25 which revealed a Calcium score of 634, and moderate stenosis in the proximal LAD and mid RCA due to calcified plaque. CT FFR -> RCA 0.76.  TTE 4/24/25: Ef normal GIDD.  Pt is now referred for [facility] with possible intervention if clinically indicated.     Pre cath note:  indication:  [ ] STEMI                [ ] NSTEMI                 [ ] Acute coronary syndrome                   [ ]Unstable Angina   [ ] high risk  [ ] intermediate risk  [ ] low risk                   [ ] Stable Angina     non-invasive testing:  CCTA             Date: 5/21/25        result: [ ] high risk  [ x] intermediate risk  [ ] low risk    Anti- Anginal medications:                    [ ] not used d/t                     [ ] used   ( ) BB     ( ) CCB      ( ) Nitrate   (  ) Ranexa          [ ] not used but strong indication not to use    Ejection Fraction                   [ ] <29            [ ] 30-39%   [ ] 40-49%     [ ]>50%    CHF          [ ] active (within last 14 days on meds   [ ] Chronic (on meds but no exacerbation)  NYHA Functional Class:  (  ) Class I (no limitations)  (  ) Class II (slight limitation)  (  ) Class III (marked limitation)  (  ) Class IV (symptoms at rest)    COPD                   [ ] mild (on chronic bronchodilators)  [ ] moderate (on chronic steroid therapy)      [ ] severe (indication for home O2 or PACO2 >50)    Other risk factors:                     [ ] Previous MI                     [ ] CVA/ stroke                    [ ] carotid stent/ CEA                    [ ] PVD/PAD- (arterial aneurysm, non-palpable pulses, tortuous vessel with inability to insert catheter, infra-renal dissection, renal or subclavian artery stenosis)                    [ ] previous CABG                    [ ] Renal Failure:  on HD  (  ) yes  (  ) no                    [ ] Diabetic  (  ) Type 1  (  ) Type 2                                         (  ) Insulin dependent  (  ) non-insulin dependent                                         (  ) Metformin  (  ) Januvia  (  ) Glimepiride  (  ) Glipizide  (  ) Glyburide  (  ) Actos                                         (  ) GLP-1 receptor agonists (Ozempic, Mounjaro, Wegovy, Zepbound, Trulicity, Byetta, Victoza)                                         (  ) SGLT2 Inhibitors (Farxiga, Jardiance, Invokana)                                         (  ) Other    Bleeding Risk: 1.6%    Pre-cath Hydration: (  )  cc IV bolus x 1 over 1 hr followed by:    (  ) NS @ 75cc/hr until procedure (up to 2 hrs) if EF> 50%                                                                                                                             (  ) NS @ 50cc/hr until procedure (up to 2 hrs) if EF< 50%                                        (  ) No precath hydration d/t      RIGHT RADIAL ARTERY EVALUATION:  LUIS FELIPE TEST: [] Negative          [] Positive       (02 Jun 2025 12:04)    Allergies    No Known Allergies    Intolerances      Patient seen and examined at bedside.   Patient without complaints.   Denies CP, SOB, palpitations, or dizziness    Vital Signs  HR: 70  BP: 108/43  RR: 16  SpO2: 98% on RA        MEDICATIONS  (STANDING):  chlorhexidine 4% Liquid 1 Application(s) Topical once  sodium chloride 0.9%. 250 milliLiter(s) (250 mL/Hr) IV Continuous <Continuous>  sodium chloride 0.9%. 150 milliLiter(s) (75 mL/Hr) IV Continuous <Continuous>  sodium chloride 0.9%. 1000 milliLiter(s) (150 mL/Hr) IV Continuous <Continuous>    MEDICATIONS  (PRN):      REVIEW OF SYSTEMS:          All negative except as mentioned in HPI    PHYSICAL EXAM:           CONSTITUTIONAL: Well-developed; well-nourished; in no acute distress  	SKIN: warm, dry  	HEAD: Normocephalic; atraumatic  	EYES: PERRL.  	ENT: No nasal discharge, airway clear, mucous membranes moist  	NECK: Supple; non tender.  	CARD: +S1, +S2, no murmurs, gallops, or rubs. Regular rate and rhythm    	RESP: No wheezes, rales or rhonchi. CTA B/L  	ABD: soft ntnd, + BS x 4 quadrants  	EXT: moves all extremities,  no clubbing, cyanosis or edema  	NEURO: Alert and oriented x3, no focal deficits          PSYCH: Cooperative, appropriate          VASCULAR:  + Rad / + PTs / + DPs          EXTREMITY:             	   Right Radial: Dressing D/C/I, DSTAT in place, access site soft, no hematoma, no pain, + pulses, no sign of infection, no numbness            ECG: pending    LABS:                        13.0   4.52  )-----------( 264      ( 03 Jun 2025 09:44 )             38.1     06-03    140  |  104  |  9[L]  ----------------------------<  100[H]  4.4   |  25  |  0.8    Ca    9.2      03 Jun 2025 09:44      A/P:  I discussed the case with Cardiologist Dr. Peraza and recommend the following:    S/P PCI:    Intervention: Successful IVUS guided balloon angioplasty with RACHEL X 1 of 80% RCA stenosis    Implants: Synergy 3.5 X 24 to mid RCA  FINDINGS:     Coronary Dominance: Right    LM: Mild atherosclerosis with 30% stenosis    LAD: Prox LAD mild atherosclerosis with 30% stenosis  D1: Mild atherosclerosis    CX: Mild atherosclerosis  OM1: Mild atherosclerosis    RCA: Prox RCA 80% stenosis s/p PCI. Distal RCA 30% stenosis  RPDA: Mild atherosclerosis      	     Continue DAPT ( Aspirin 81 mg PO Daily and Plavix 75 mg daily  ), Statin Therapy                   Patient given 30 day supply of ( Aspirin 81 mg daily and Plavix 75 mg daily ) to take at home                   NO BB/ACEi, patient with marginal BP                   START Famotidine 20mg po x 30 days                   CBC at 1730 - Hb stable @ 10.9 from 13                    EKG prior to d/c at 1730 - no acute changes                    NS at 150 ml/hr x 6hrs                   OOB Ambulate                    Monitor access site/ distal pulses                   Patient agreeing to take DAPT for at least one year or as directed by cardiologist                    Pt given instructions on importance of taking antiplatelet medication or risk acute stent thrombosis/death                   Post cath instructions, access site care and activity restrictions reviewed with patient                     Discussed with patient to return to hospital if experience chest pain, shortness breath, dizziness and site bleeding                   Aggressive risk factor modification, diet counseling, smoking cessation discussed with patient                    Benefits of cardiac rehab discussed with patient                    Cardiac rehab info provided/referral and communication to cardiac rehab completed                   Patient instructed to call cardiac rehab and make initial appointment after first follow-up visit with Cardiologist                    Can discharge patient at 2030 from cardiac standpoint after ambulating without symptoms, access site wnl, labs and ECG reviewed                    Follow up with Cardiology Dr. Peraza in two weeks.  Instructed to call and make an appointment

## 2025-06-03 NOTE — ASU DISCHARGE PLAN (ADULT/PEDIATRIC) - CARE PROVIDER_API CALL
Bradford Peraza  Interventional Cardiology  [address] [place] [address], [address]  [place] [address]  Phone: [phone]  Fax: [phone]  Follow Up Time: 2 weeks

## 2025-06-03 NOTE — ASU DISCHARGE PLAN (ADULT/PEDIATRIC) - ASU DC SPECIAL INSTRUCTIONSFT
Activity:  - Do not drive or operate heavy machinery for 24 hours.  - Limit your physical or any strenuous activity for 2 weeks after angioplasty and 48 hours for angiogram. Support the groin site with your hand when you sneeze or cough. No heavy lifting ( objects more then 10 pounds).  - For wrist access, avoid using affected arm for 24 hours after removal of dressing and avoid heavy lifting for 7 days.    Hygiene:  - After 24 hours, you may shower and remove the dressing from the site. Do not tub bathe for one week. Do not rub or apply lotion, cream, powder to the affected site. Leave it open to air.     Diet:   - You may resume your diet. Low Sodium. Low Fat, Low Cholesterol.  If Diabetic - Carbohydrate Consistent Diet.      - Drink extra fluid unless otherwise advised.     Special Instructions:  - Bruising or black and blue at the puncture site is possible.  - If there is bleeding from the puncture site (groin or wrist) apply direct firm pressure on the site and call 911.  - Any sudden swelling, redness, fever, discharge or severe pain, call your physician or call the cath lab.   - If you notice any scab formation in the area avoid touching the site and allow it to heal.  - Numbness or "pins and needle" sensation in the affected arm, hand, leg or if the affected site become cool to touch or pale that persist for extended period of time call your physician immediately to be checked.  - If you developed chest pain, not relieved by your usual routine medication, fainting, lethargy, weakness, report to the nearest emergency room.   - Inform your Dentist or Surgeon if you are taking Aspirin or any antiplatelet medications. Report any bleeding in your urine or stool.     Medications:  - Continue dual anti-platelet therapy, beta blocker, Statin   - Soreness or tenderness at the site is possible it will diminish over time. You may take Tylenol every 4-6 hours as needed. Nothing stronger is needed.  - If you are diabetic and taking medication containing Metformin, do not take them for 48 hours after the procedure.     Any questions call Cardiac Cath Lab at [phone] or [phone], Monday - Friday from 7 - 9 pm.

## 2025-06-03 NOTE — CHART NOTE - NSCHARTNOTEFT_GEN_A_CORE
PRE-OP DIAGNOSIS:    Unstable Angina, AUC 8    PROCEDURE:     [x] Coronary Angiogram     [x] LHC     [] LVG     [] RHC     [x] Intervention (see below)         PHYSICIAN:  Dr Peraza    ASSISTANT: Dr BASIA Loco     PROCEDURE DESCRIPTION:     Consent:      [x] Patient     [] Family Member     []  Used        Anesthesia:     [] General     [x] Sedation     [x] Local        Access & Closure:     [x] 6 Fr Right Radial Artery (D stat closure)     [] Fr Femoral Artery     [] Fr Femoral Vein     [] Fr Brachial Vein       IV Contrast: 100 mL        Intervention: Successful IVUS guided balloon angioplasty with RACHEL X 1 of 80% RCA stenosis      Implants: Synergy 3.5 X 24 to mid RCA       FINDINGS:     Coronary Dominance: Right      LM: Mild atherosclerosis with 30% stenosis    LAD: Prox LAD mild atherosclerosis with 30% stenosis  D1: Mild atherosclerosis    CX: Mild atherosclerosis  OM1: Mild atherosclerosis    RCA: Prox RCA 80% stenosis s/p PCI. Distal RCA 30% stenosis  RPDA: Mild atherosclerosis     LVEDP: 6 mmHg       ESTIMATED BLOOD LOSS: < 10 mL        CONDITION:     [x] Good     [] Fair     [] Critical        SPECIMEN REMOVED: N/A       POST-OP DIAGNOSIS:      [x] 1 Vessel Coronary Artery Disease. Prox RCA 80% stenosis s/p PCI        PLAN OF CARE:     [x] D/C Home Today     [x] Medications: Aspirin 81 mg qd, Plavix 75 mg qd, Rosuvastatin 10mg qd.     [x] IV Fluids: IV NS @ 150cc/hr for 6 hours

## 2025-06-10 DIAGNOSIS — E78.5 HYPERLIPIDEMIA, UNSPECIFIED: ICD-10-CM

## 2025-06-10 DIAGNOSIS — I25.110 ATHEROSCLEROTIC HEART DISEASE OF NATIVE CORONARY ARTERY WITH UNSTABLE ANGINA PECTORIS: ICD-10-CM
